# Patient Record
Sex: FEMALE | Race: WHITE | NOT HISPANIC OR LATINO
[De-identification: names, ages, dates, MRNs, and addresses within clinical notes are randomized per-mention and may not be internally consistent; named-entity substitution may affect disease eponyms.]

---

## 2022-11-16 ENCOUNTER — APPOINTMENT (OUTPATIENT)
Dept: ORTHOPEDIC SURGERY | Facility: CLINIC | Age: 52
End: 2022-11-16

## 2022-11-16 VITALS — WEIGHT: 188 LBS | BODY MASS INDEX: 31.32 KG/M2 | HEIGHT: 65 IN

## 2022-11-16 DIAGNOSIS — Z78.9 OTHER SPECIFIED HEALTH STATUS: ICD-10-CM

## 2022-11-16 DIAGNOSIS — E78.00 PURE HYPERCHOLESTEROLEMIA, UNSPECIFIED: ICD-10-CM

## 2022-11-16 DIAGNOSIS — M75.31 CALCIFIC TENDINITIS OF RIGHT SHOULDER: ICD-10-CM

## 2022-11-16 PROBLEM — Z00.00 ENCOUNTER FOR PREVENTIVE HEALTH EXAMINATION: Status: ACTIVE | Noted: 2022-11-16

## 2022-11-16 PROCEDURE — J3490M: CUSTOM | Mod: RT

## 2022-11-16 PROCEDURE — 20611 DRAIN/INJ JOINT/BURSA W/US: CPT | Mod: RT

## 2022-11-16 PROCEDURE — 99204 OFFICE O/P NEW MOD 45 MIN: CPT | Mod: 25

## 2022-11-16 NOTE — DISCUSSION/SUMMARY
[de-identified] : Instructions:  Progress Note completed by Domitila Barron PA-C\par * Dr. Stephens -- The documentation recorded accurately reflects the decisions made by me during this visit.

## 2022-11-16 NOTE — PROCEDURE
[FreeTextEntry3] : Procedure Name: Large Joint Injection / Aspiration: Celestone, Lidocaine and Marcaine with Ultrasound Evaluation and Guidance\par \par Large Joint Injection was performed because of pain and inflammation. Anesthesia: ethyl chloride sprayed topically.\par Celestone: An injection of Celestone 12 mg , 2 cc.\par Lidocaine 1%:  3cc\par Marcaine 0.25%:  3cc\par \par Patient has tried OTC's including aspirin, Ibuprofen, Aleve etc or prescription NSAIDS, and/or exercises at home and/ or physical therapy without satisfactory response and Patient has decreased mobility in the joint. The risks, benefits, and alternatives to cortisone injection were explained in full to the patient. Risks outlined include but are not limited to infection, sepsis, bleeding, scarring, skin discoloration, temporary increase in pain, syncopal episode, failure to resolve symptoms, allergic reaction, symptom recurrence, and elevation of blood sugar in diabetics. Patient understood the risks. All questions were answered.   Oral informed consent was obtained.   Sterile technique was utilized for the procedure including the preparation of the solutions used for the injection. Medication was injected into RIGHT SUBACROMIAL SPACE   Patient tolerated the procedure well. Advised to ice the injection site this evening.  Post Procedure Instructions: Patient was advised to call if redness, pain, or fever occur and apply ice for 15 min. out of every hour for the next 12-24 hours as tolerated. patient was advised to rest the joint(s) for 2 days. \par \par Ultrasound Extremity (35403) was used because of the following reasons: inflammation.\par Ultrasound Guidance was used for the following reasons: for accurate placement of needle into SUBACROMIAL SPACE\par Diagnostic ultrasound was performed of the SUBACROMIAL SPACE and is positive for synovitis.\par Ultrasound guided injection was performed of the subacromial space, visualization of the needle and placement of injection was performed without complication.

## 2022-11-16 NOTE — PHYSICAL EXAM
[Right] : right shoulder [5 ___] : forward flexion 5[unfilled]/5 [4___] : abduction 4[unfilled]/5 [5___] : internal rotation 5[unfilled]/5 [] : motor and sensory intact distally [FreeTextEntry8] : less [TWNoteComboBox7] : active forward flexion 170 degrees [de-identified] : active abduction 150 degrees [TWNoteComboBox6] : internal rotation L4 [de-identified] : external rotation 80 degrees

## 2022-11-16 NOTE — DATA REVIEWED
[Outside X-rays] : outside x-rays [Right] : of the right [Shoulder] : shoulder [Report was reviewed and noted in the chart] : The report was reviewed and noted in the chart [I reviewed the films/CD] : I reviewed the films/CD

## 2022-11-16 NOTE — HISTORY OF PRESENT ILLNESS
[Sharp] : sharp [de-identified] : 11/16/22:  acute onset of right shoulder pain since 11/9/22.  no injury.  no fevers or chills.   [facility].  dx with calcific tendonitis.  patient reports she is improving pain wise although she has some discomfort in the shoulder.  she is taking naprosyn with some relief.   no prior right shoulder issues as per patient.  [] : no [FreeTextEntry1] : right shoulder  [FreeTextEntry3] : 11/9/22  [FreeTextEntry5] : pt states no injury has occurred  [FreeTextEntry7] : numbness on fingers  [de-identified] : 11/10/22 [de-identified] : [facility]

## 2022-12-28 ENCOUNTER — APPOINTMENT (OUTPATIENT)
Dept: ORTHOPEDIC SURGERY | Facility: CLINIC | Age: 52
End: 2022-12-28

## 2024-10-05 NOTE — ASSESSMENT
[FreeTextEntry1] : acute onset of right shoulder pain since 11/9/22.  no injury.  no fevers or chills.  xrays from [facility] with calcific tendonitis.  symptoms resolving.  \par \par nurse
not applicable